# Patient Record
Sex: FEMALE | Race: WHITE | Employment: FULL TIME
[De-identification: names, ages, dates, MRNs, and addresses within clinical notes are randomized per-mention and may not be internally consistent; named-entity substitution may affect disease eponyms.]

---

## 2021-02-23 ENCOUNTER — TRANSCRIBE ORDERS (OUTPATIENT)
Dept: OTHER | Age: 31
End: 2021-02-23

## 2021-02-23 DIAGNOSIS — Z80.0 FAMILY HISTORY OF LYNCH SYNDROME: Primary | ICD-10-CM

## 2021-02-23 NOTE — TELEPHONE ENCOUNTER
Oncology/Surgical Oncology Referral Request:     Specialty Requested: Medical Oncology     Referring Provider: Dr. Ruth Rashid     Referring Clinic/Organization: [facility]    Records location: No records received     Requested Provider (if specified): Med [facility]y

## 2021-03-10 ENCOUNTER — PRE VISIT (OUTPATIENT)
Dept: ONCOLOGY | Facility: CLINIC | Age: 31
End: 2021-03-10

## 2021-03-10 ENCOUNTER — VIRTUAL VISIT (OUTPATIENT)
Dept: ONCOLOGY | Facility: CLINIC | Age: 31
End: 2021-03-10
Attending: FAMILY MEDICINE
Payer: MEDICAID

## 2021-03-10 DIAGNOSIS — Z80.49 FAMILY HISTORY OF UTERINE CANCER: ICD-10-CM

## 2021-03-10 DIAGNOSIS — Z80.0 FAMILY HISTORY OF COLON CANCER: ICD-10-CM

## 2021-03-10 DIAGNOSIS — Z84.81 FAMILY HISTORY OF GENE MUTATION: Primary | ICD-10-CM

## 2021-03-10 DIAGNOSIS — Z80.8 FAMILY HISTORY OF BRAIN CANCER: ICD-10-CM

## 2021-03-10 DIAGNOSIS — Z80.0 FAMILY HISTORY OF LYNCH SYNDROME: ICD-10-CM

## 2021-03-10 PROCEDURE — 96040 HC GENETIC COUNSELING, EACH 30 MINUTES: CPT | Mod: TEL | Performed by: GENETIC COUNSELOR, MS

## 2021-03-10 NOTE — LETTER
Cancer Risk Management  Program Locations    [facility]  [facility]  [facility]  [facility]  [facility]  Mailing Address  Cancer Risk Management Program  [facility]  [address]  [facility] [address]  [place] [address]    New patient appointments  [phone]  March 28, 2021    Emma Rosas  [address]  [facility] [address]      Dear Emma,    It was a pleasure speaking with you on the phone on 3-. Here is a copy of the progress note from our discussion. I apologize for the inadvertent delay in getting this letter to you.  If you have any additional questions, please feel free to call.    Cancer Risk Management Program Genetic Counseling Note    3/10/2021    Referring Provider: Dr. Ruth Rashid    Presenting Information:   I had a telephone visit with Emma Alison today for genetic counseling through the Cancer Risk Management Program, in order to discuss her family history of cancer, in the context of a known MSH6 gene mutation in her mother.  She presents today to review this history, cancer screening recommendations, and available genetic testing options.  This consultation was conducted via a telephone visit, because of current COVID-RIVS restrictions.    Personal History:  Emma is a 30 year old female. She does not have any personal history of cancer. In other medical history, it is reported that Emma has a history of asthma. Emma's mother has previously reported that Emma has a history of some learning challenges.    Emma reports that she had her first menstrual period at age 15 and her first child at age 21.  Emma reports that she did use oral contraceptives for around 5 years or so, but she states that she had a hysterectomy about 1 year ago because of issues with excessive menstrual bleeding and cramping.  Emma  reports that her ovaries are still intact.  Other than her Pap smears, Emma reports that she has not yet started any other routine cancer screening.  She does report that she has had one mole removed but this was not found to be cancerous.    Emma reports a history of smoking from around age 17 until around age 27, but she reports that she quit smoking over 2 years ago.  She reports no personal history of chronic, excessive alcohol use.  Emma states that she is not aware of any other personal history of any specific, potentially concerning environmental exposures with respect to cancer risk.    Family History: (Please see scanned pedigree for detailed family history information); I had previously met with Emma's mother for genetic counseling, and she had signed a release of information form to allow me to discuss the information provided/obtained during her previous genetic counseling/testing with Emma:    Emma's mother has tested positive for a mutation in the MSH6 gene called [id number] (also known as [id number]*). Emma's mother does not have a personal history of cancer, but she does have a history of one colon polyp.  This testing was prompted by the identification of an MSH6 gene mutation in Emma's mother's paternal side of the family.  Emma's mother's father  of complications of a brain tumor (GBM) in his 60s; he had a sister diagnosed with colon cancer in her 50s (who has tested positive for the MSH6 gene mutation) and a sister diagnosed with uterine cancer in her 60s (who has not had genetic testing).  Emma's mother's paternal grandmother had a history of breast cancer at age 50 and liver cancer at age 50, and Emma's mother's paternal grandfather was diagnosed with pancreatic cancer in his 60s.  Some of Emma's mother's paternal cousins have also tested positive for the familial MSH6 gene mutation.  Emma's mother also reports some other extended  paternal history of cancer, along with a family history of breast cancer in her maternal aunt (in her 50s) and a uterine cancer and a breast cancer in her maternal grandmother (in her 70s and 80s, respectively.)  Given her extended family history of cancer, Emma's mother did have a broader hereditary cancer genetic testing panel ([facility] through [employer]: APC, ABDULAZIZ, AXIN2, BARD1, BMPR1A, BRCA1, BRCA2, BRIP1, CDH1, CDK4, CDKN2A, CHEK2, DICER1, EPCAM, GREM1, HOXB13, MLH1, MSH2, MSH3, MSH6, MUTYH, NBN, NF1, NTHL1, PALB2, PMS2, POLD1, POLE, PTEN, RAD51C, RAD51D, RECQL, SMAD4, SMARCA4, STK11, and TP53) and the only gene mutation noted was the familial MSH6 gene mutation.      Emma reports that she is not aware of any family history of cancer on her father's side of the family.        Emma reports no other genetic concerns about her family medical history, other the MSH6 gene mutation in her family.      Emma reports that her family is primarily of [place] ancestry.    Discussion:    We reviewed the features of sporadic, familial, and hereditary cancers. In particular, we reviewed the hereditary cancer syndrome seen in her family: Pino syndrome.  Pino syndrome can be caused by a mutation in one of five genes:  MLH1, MSH2, MSH6, PMS2, and EPCAM.  The highest cancer risks associated with Pino syndrome include colon cancer, endometrial/uterine cancer, gastric cancer, and ovarian cancer.  Other cancers have also been reported with Pino syndrome, such as urinary tract, pancreas, bile duct, and other cancers.       We reviewed Emma's mother's previous genetic testing results that identified a mutation in the MSH6 gene.  We discussed the autosomal dominant pattern of inheritance associated with this mutation and that we would expect that Emma had a 50% risk of inheriting a copy of the MSH6 gene mutation seen in her mother.      Based on her family history, Emma meets current National  Comprehensive Cancer Network (NCCN) criteria for genetic testing of MSH6, because of her mother being identified to have a mutation in that gene.  We discussed that there are additional genes that could cause increased risk for cancer, but Emma's mother's genetic testing did not identify any other gene mutations associated with hereditary cancer syndromes.  In addition, it is reported that Emma's father has no family history of cancer.  Because of this, it seems reasonable to just test Emma for the MSH6 gene mutation seen in her mother, and Emma was in agreement with this plan.       We did talk about that since I met with Emma's mother a year ago, there have been some updates to the estimated cancer risks and cancer screening recommendations for individuals with a mutation in MSH6:    Per the National Comprehensive Cancer Network (NCCN), individuals with mutations in the MSH6 gene have an estimated:       10-44% lifetime risk of developing colon cancer (as compared to 4.2% lifetime risk in the general population)    16-49% lifetime risk of endometrial (uterine) cancer (as compared to 3.1% in the general population)     1-13% lifetime ovarian cancer risk (as compared to 1.3% in the general population).     Additional risks are seen for stomach (1-7.9%%), small bowel (1-4%), renal/ureter (0.7-5.5%), bladder (1-8.2%), pancreatic (1.4-1.6%), prostate (2.5-11.6%), brain (0.8-1.8%), breast (11.1-12.8%), and hepatobiliary tract cancers (0.2-1%). Some families with Pino syndrome also have an increased risk for sebaceous neoplasms.    Several studies have also suggested that individuals with Pino syndrome may be at increased risk for other cancers, but additional research is needed to clarify these potential risks.      The following screening, per current National Comprehensive Cancer Network (NCCN) guidelines is recommended for individuals who have Pino syndrome due to a mutation in the  MSH6 gene:       Colonoscopies starting at age 30-35 (or earlier based on family history), repeated every 1-2 years.    Annual physical/neurological exams starting at 25-30.     Possible consideration of upper endoscopy (EGD) with extended duodenoscopy starting at age 40, repeated every 3-5 years.  As part of this, H. pylori testing (and subsequent treating of this, if positive) could be considered. These recommendations are largely dependent on family history and other factors.    There is limited evidence to suggest a screening strategy for urothelial/urinary tract cancers. Annual urinalysis starting at age 30-35 can be considered. Consideration for screening is largely dependent upon family history, gender, and specific Pino syndrome mutation.    Women can consider hysterectomy due to the limitations in screening for endometrial cancer. Screening for uterine cancer has not been shown to be beneficial in women with Pino syndrome; however screening via endometrial biopsy every 1-2 years can be considered. In post-menopausal women, transvaginal ultrasound may also be considered.     A bilateral salpingo-oophorectomy (removal of both ovaries and fallopian tubes) may reduce the chance to develop ovarian cancer. Timing of this surgery may be individualized as with hysterectomy, noted above. At this time, NCCN states that there is insufficient evidence to make a specific recommendations regarding removal of the ovaries and fallopian tubes in women with MSH6 mutations.  However, some women do still consider oophorectomy, since there is somewhat limited and conflicting data about ovarian cancer risks for this gene.  Of note, there is not an effective screening method for ovarian cancer, but transvaginal ultrasound and a CA-125 blood test may be considered at the discretion of each individual's clinician.     There is limited evidence to recommend early or more frequent screening for prostate cancer in men who have Pino  syndrome. However, men with Pino syndrome are encouraged to follow prostate cancer screening as recommended in the NCCN Guidelines for Prostate Early Detection. This screening should be discussed with each individual's medical provider.    There are currently no specific screening recommendations for other potential Pino syndrome-related cancers such as pancreatic cancer, hepatobiliary tract cancer, etc. However, screening for these and other cancers may be recommended based on family history.      Medical Management: For Emma, we reviewed that the information from genetic testing may determine:    additional cancer screening for which Emma may qualify (eg. earlier and more frequent colonoscopies, more frequent dermatologic exams, etc.),    options for risk-reducing surgeries Emma could consider (e.g surgery to remove her ovaries),      and targeted therapies for Emma, if she were to develop certain cancers in the future (e.g.  immunotherapy for individuals with Pino syndrome.)       These recommendations and possible targeted therapies will be discussed in detail once genetic testing is completed.     Plan:  1) Today, Emma elected to proceed with genetic testing for the MSH6 gene mutation noted in her mother.  Emma understands that if she inherited a copy of this gene mutation from her mother, then the above-mentioned increased screening and medical management would be recommended for her.  On the other hand, if Emma did not inherit a copy of her mother's MSH6 gene mutation, then she would not need increased screening/medical management based on her mother's family history of cancer.    2) We talked about the options for sample collection: a blood sample or a saliva kit.  Emma reports she prefers to have her blood drawn, and so orders were placed for this.  Genetic test results should become available within a month after having her blood drawn.    3) Emma will have a  telephone visit or video visit in order to discuss the results.    Demetrice Baer MS, [facility]  Genetic Counselor  Ph: [phone]    Time spent on the phone: 40 minutes

## 2021-03-11 DIAGNOSIS — Z84.81 FAMILY HISTORY OF GENE MUTATION: ICD-10-CM

## 2021-03-11 DIAGNOSIS — Z80.0 FAMILY HISTORY OF LYNCH SYNDROME: ICD-10-CM

## 2021-03-15 LAB
MISCELLANEOUS TEST: NORMAL
RESULT: NORMAL
SEND OUTS MISC TEST CODE: 5555
SEND OUTS MISC TEST SPECIMEN: NORMAL
TEST NAME: NORMAL

## 2021-03-21 ENCOUNTER — HEALTH MAINTENANCE LETTER (OUTPATIENT)
Age: 31
End: 2021-03-21

## 2021-03-26 LAB — LAB SCANNED RESULT: ABNORMAL

## 2021-03-28 NOTE — PROGRESS NOTES
Cancer Risk Management Program Genetic Counseling Note    3/10/2021    Referring Provider: Dr. Ruth Rashid    Presenting Information:   I had a telephone visit with Emma Rosas today for genetic counseling through the Cancer Risk Management Program, in order to discuss her family history of cancer, in the context of a known MSH6 gene mutation in her mother.  She presents today to review this history, cancer screening recommendations, and available genetic testing options.  This consultation was conducted via a telephone visit, because of current COVID-19 restrictions (see attestation below).    Personal History:  Emma is a 30 year old female. She does not have any personal history of cancer. In other medical history, it is reported that Emma has a history of asthma. Emma's mother has previously reported that Emma has a history of some learning challenges.    Emma reports that she had her first menstrual period at age 15 and her first child at age 21.  Emma reports that she did use oral contraceptives for around 5 years or so, but she states that she had a hysterectomy about 1 year ago because of issues with excessive menstrual bleeding and cramping.  Emma reports that her ovaries are still intact.  Other than her Pap smears, Emma reports that she has not yet started any other routine cancer screening.  She does report that she has had one mole removed but this was not found to be cancerous.    Emma reports a history of smoking from around age 17 until around age 27, but she reports that she quit smoking over 2 years ago.  She reports no personal history of chronic, excessive alcohol use.  Emma states that she is not aware of any other personal history of any specific, potentially concerning environmental exposures with respect to cancer risk.    Family History: (Please see scanned pedigree for detailed family history information); I had previously met with Emma's  mother for genetic counseling, and she had signed a release of information form to allow me to discuss the information provided/obtained during her previous genetic counseling/testing with Emma:    Emma's mother has tested positive for a mutation in the MSH6 gene called [id number] (also known as [id number]*). Emma's mother does not have a personal history of cancer, but she does have a history of one colon polyp.  This testing was prompted by the identification of an MSH6 gene mutation in Emma's mother's paternal side of the family.  Emma's mother's father  of complications of a brain tumor (GBM) in his 60s; he had a sister diagnosed with colon cancer in her 50s (who has tested positive for the MSH6 gene mutation) and a sister diagnosed with uterine cancer in her 60s (who has not had genetic testing).  Emma's mother's paternal grandmother had a history of breast cancer at age 50 and liver cancer at age 50, and Emma's mother's paternal grandfather was diagnosed with pancreatic cancer in his 60s.  Some of Emma's mother's paternal cousins have also tested positive for the familial MSH6 gene mutation.  Emma's mother also reports some other extended paternal history of cancer, along with a family history of breast cancer in her maternal aunt (in her 50s) and a uterine cancer and a breast cancer in her maternal grandmother (in her 70s and 80s, respectively.)  Given her extended family history of cancer, Emma's mother did have a broader hereditary cancer genetic testing panel ([facility] through [employer]: APC, ABDULAZIZ, AXIN2, BARD1, BMPR1A, BRCA1, BRCA2, BRIP1, CDH1, CDK4, CDKN2A, CHEK2, DICER1, EPCAM, GREM1, HOXB13, MLH1, MSH2, MSH3, MSH6, MUTYH, NBN, NF1, NTHL1, PALB2, PMS2, POLD1, POLE, PTEN, RAD51C, RAD51D, RECQL, SMAD4, SMARCA4, STK11, and TP53) and the only gene mutation noted was the familial MSH6 gene mutation.      Emma reports that she is not aware of any  family history of cancer on her father's side of the family.        Emma reports no other genetic concerns about her family medical history, other the MSH6 gene mutation in her family.      Emma reports that her family is primarily of [place] ancestry.    Discussion:    We reviewed the features of sporadic, familial, and hereditary cancers. In particular, we reviewed the hereditary cancer syndrome seen in her family: Pino syndrome.  Pino syndrome can be caused by a mutation in one of five genes:  MLH1, MSH2, MSH6, PMS2, and EPCAM.  The highest cancer risks associated with Pino syndrome include colon cancer, endometrial/uterine cancer, gastric cancer, and ovarian cancer.  Other cancers have also been reported with Pino syndrome, such as urinary tract, pancreas, bile duct, and other cancers.       We reviewed Emma's mother's previous genetic testing results that identified a mutation in the MSH6 gene.  We discussed the autosomal dominant pattern of inheritance associated with this mutation and that we would expect that Emma had a 50% risk of inheriting a copy of the MSH6 gene mutation seen in her mother.      Based on her family history, Emma meets current National Comprehensive Cancer Network (NCCN) criteria for genetic testing of MSH6, because of her mother being identified to have a mutation in that gene.  We discussed that there are additional genes that could cause increased risk for cancer, but Emma's mother's genetic testing did not identify any other gene mutations associated with hereditary cancer syndromes.  In addition, it is reported that Emma's father has no family history of cancer.  Because of this, it seems reasonable to just test Emma for the MSH6 gene mutation seen in her mother, and Emma was in agreement with this plan.       We did talk about that since I met with Emma's mother a year ago, there have been some updates to the estimated cancer risks and  cancer screening recommendations for individuals with a mutation in MSH6:    Per the National Comprehensive Cancer Network (NCCN), individuals with mutations in the MSH6 gene have an estimated:       10-44% lifetime risk of developing colon cancer (as compared to 4.2% lifetime risk in the general population)    16-49% lifetime risk of endometrial (uterine) cancer (as compared to 3.1% in the general population)     1-13% lifetime ovarian cancer risk (as compared to 1.3% in the general population).     Additional risks are seen for stomach (1-7.9%%), small bowel (1-4%), renal/ureter (0.7-5.5%), bladder (1-8.2%), pancreatic (1.4-1.6%), prostate (2.5-11.6%), brain (0.8-1.8%), breast (11.1-12.8%), and hepatobiliary tract cancers (0.2-1%). Some families with Pino syndrome also have an increased risk for sebaceous neoplasms.    Several studies have also suggested that individuals with Pino syndrome may be at increased risk for other cancers, but additional research is needed to clarify these potential risks.      The following screening, per current National Comprehensive Cancer Network (NCCN) guidelines is recommended for individuals who have Pino syndrome due to a mutation in the MSH6 gene:       Colonoscopies starting at age 30-35 (or earlier based on family history), repeated every 1-2 years.    Annual physical/neurological exams starting at 25-30.     Possible consideration of upper endoscopy (EGD) with extended duodenoscopy starting at age 40, repeated every 3-5 years.  As part of this, H. pylori testing (and subsequent treating of this, if positive) could be considered. These recommendations are largely dependent on family history and other factors.    There is limited evidence to suggest a screening strategy for urothelial/urinary tract cancers. Annual urinalysis starting at age 30-35 can be considered. Consideration for screening is largely dependent upon family history, gender, and specific Pino syndrome  mutation.    Women can consider hysterectomy due to the limitations in screening for endometrial cancer. Screening for uterine cancer has not been shown to be beneficial in women with Pino syndrome; however screening via endometrial biopsy every 1-2 years can be considered. In post-menopausal women, transvaginal ultrasound may also be considered.     A bilateral salpingo-oophorectomy (removal of both ovaries and fallopian tubes) may reduce the chance to develop ovarian cancer. Timing of this surgery may be individualized as with hysterectomy, noted above. At this time, NCCN states that there is insufficient evidence to make a specific recommendations regarding removal of the ovaries and fallopian tubes in women with MSH6 mutations.  However, some women do still consider oophorectomy, since there is somewhat limited and conflicting data about ovarian cancer risks for this gene.  Of note, there is not an effective screening method for ovarian cancer, but transvaginal ultrasound and a CA-125 blood test may be considered at the discretion of each individual's clinician.     There is limited evidence to recommend early or more frequent screening for prostate cancer in men who have Pino syndrome. However, men with Pino syndrome are encouraged to follow prostate cancer screening as recommended in the NCCN Guidelines for Prostate Early Detection. This screening should be discussed with each individual's medical provider.    There are currently no specific screening recommendations for other potential Pino syndrome-related cancers such as pancreatic cancer, hepatobiliary tract cancer, etc. However, screening for these and other cancers may be recommended based on family history.      Medical Management: For Emma, we reviewed that the information from genetic testing may determine:    additional cancer screening for which Emma may qualify (eg. earlier and more frequent colonoscopies, more frequent dermatologic  exams, etc.),    options for risk-reducing surgeries Emma could consider (e.g surgery to remove her ovaries),      and targeted therapies for Emma, if she were to develop certain cancers in the future (e.g.  immunotherapy for individuals with Pino syndrome.)       These recommendations and possible targeted therapies will be discussed in detail once genetic testing is completed.     Plan:  1) Today, Emma elected to proceed with genetic testing for the MSH6 gene mutation noted in her mother.  Emma understands that if she inherited a copy of this gene mutation from her mother, then the above-mentioned increased screening and medical management would be recommended for her.  On the other hand, if Emma did not inherit a copy of her mother's MSH6 gene mutation, then she would not need increased screening/medical management based on her mother's family history of cancer.    2) We talked about the options for sample collection: a blood sample or a saliva kit.  Emma reports she prefers to have her blood drawn, and so orders were placed for this.  Genetic test results should become available within a month after having her blood drawn.    3) Emma will have a telephone visit or video visit in order to discuss the results.    Demetrice Baer MS, [facility]  Genetic Counselor  Ph: [phone]    Time spent on the phone: 40 minutes    Emma is a 30 year old who is being evaluated via a billable telephone visit.      What phone number would you like to be contacted at? [phone]  How would you like to obtain your AVS? mail     Phone call duration: 40 minutes

## 2021-04-06 ENCOUNTER — VIRTUAL VISIT (OUTPATIENT)
Dept: ONCOLOGY | Facility: CLINIC | Age: 31
End: 2021-04-06
Attending: GENETIC COUNSELOR, MS
Payer: MEDICAID

## 2021-04-06 DIAGNOSIS — Z15.09 MSH6-RELATED LYNCH SYNDROME (HNPCC5): Primary | ICD-10-CM

## 2021-04-06 DIAGNOSIS — Z80.0 FAMILY HISTORY OF COLON CANCER: ICD-10-CM

## 2021-04-06 DIAGNOSIS — Z80.49 FAMILY HISTORY OF UTERINE CANCER: ICD-10-CM

## 2021-04-06 DIAGNOSIS — Z80.8 FAMILY HISTORY OF BRAIN CANCER: ICD-10-CM

## 2021-04-06 PROCEDURE — 999N000069 HC STATISTIC GENETIC COUNSELING, < 16 MIN: Mod: TEL | Performed by: GENETIC COUNSELOR, MS

## 2021-04-06 NOTE — LETTER
"    Cancer Risk Management  Program Locations    [facility]  [facility]  Mailing Address  Cancer Risk Management Program  [facility]  [address]  [place] [address]    New patient appointments  [phone]  April 6, 2021    Emma Rosas  [address] [facility] [address]        Dear Emma,    It was a pleasure speaking with you and your mother again on the phone on April 6, 2021. Here is a copy of the general \"after visit summary\" from our conversation.  If you have any additional questions, please feel free to call.  You will also later receive in the mail a more detailed clinic note of our conversation as well.    Resources for Pino Syndrome     Pino Syndrome International -  http://[employer].com/   Pino [employer] ([employer]) provides support for individuals with Pino syndrome. This organization was founded by patients and health care providers who specialize in Pino syndrome. [employer] strives to raise awareness, as well as educate others about Pino syndrome.     Hereditary Colon Cancer Takes Guts - http://www.hcctakesguts.org/peer-support  This is a non-profit organization that supports and connects individuals with hereditary colon cancer syndromes.  They also promote research and health care initiatives.     I Have Pino Syndrome - http://www.ihavelynchsyndrome.org/   The goal of this non-profit organization is to educate others and raise awareness about Pino syndrome in the medical community, as well as the public.      Pino Syndrome Screening Network - http://www.lynchscreening.net/   This organization was founded in 2011, with the goal to promote universal tumor screening for Pino syndrome for those with a diagnosis of colorectal and endometrial cancers.       Other References:    Genetics " Home Reference - http://ghr.nlm.nih.gov/condition/carlson-syndrome    American Cancer Society - www.cancer.org    How Do I Tell My Children? This article is about the BRCA gene for hereditary breast cancer (a different hereditary cancer syndrome), but the themes of the article about how to talk to kids apply to Carlson syndrome, too.  http://www.facingourrisk.org/understanding-brca-and-hboc/publications/newsletter/archives/2008winter/how-tell-children.php     National Society of Genetic Counselors: http://www.nsgc.org/

## 2021-04-06 NOTE — LETTER
Cancer Risk Management  Program Locations    [facility]  [facility]  [facility]  [facility]  [facility]  Mailing Address  Cancer Risk Management Program  [facility]  [address]  [facility] [address]  [place] [address]    New patient appointments  [phone]  April 10, 2021    Emma Rosas  [address]  [facility] [address]      Dear Emma,    It was a pleasure speaking with you and your mother on the phone on 4-6-2021. Here is a copy of the progress note from our discussion. If you have any additional questions, please feel free to call.    Cancer Risk Management Program Genetic Counseling Note    4/6/2021    Referring Provider:  Dr. Ruth Rashid    Presenting Information:   Emma had a return telephone visit to the Cancer Risk Management Program, in order to to discuss her genetic testing results. Her blood was drawn on March 11, 2021, and genetic testing for the MSH6 gene was requested from [facility]. This testing was done because of her family history of of Pino syndrome associated with a known mutation in the MSH6 gene.  A previous genetic testing panel in Emma's mother had identified that Emma's mother had inherited a copy of this MS6 gene mutation.  Therefore, Emma had presented for genetic testing for the same gene mutation; no additional genetic testing was indicated for Emma, because no other mutations associated with hereditary cancer were noted in her mother, and there was no known family history of cancer on her father's side of family.  Emma's mother participated in today's telephone conversation; Emma has previously signed a release of information to allow for the sharing of medical information regarding her genetic counseling/genetic testing with her mother.    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 We discussed that this mutation is associated with a diagnosis of Pino syndrome and an increased risk for colon cancer, uterine cancer, and possibly other cancers. We discussed the impact of this testing on Emma in detail (see sections below for more information).     A copy of the test report can be found in the Laboratory tab, dated 3-, and named \"SEND OUTS [facility] TEST.\" The report is scanned in as a linked document.    Cancer Risks:   Today, we reviewed the previously discussed information about cancer risks associated with mutations in MSH6.  We did talk about that since I met with Emma's mother a year ago, there have been some updates to the estimated cancer risks and cancer screening recommendations for individuals with a mutation in MSH6:     Per the National Comprehensive Cancer Network (NCCN), individuals with mutations in the MSH6 gene have an estimated:       10-44% lifetime risk of developing colon cancer (as compared to a 4.2% risk in the general population)    16-49% lifetime risk of endometrial (uterine) cancer (as compared to 3.1% in the general population)     1-13% lifetime ovarian cancer risk (as compared to 1.3% in the general population)     Additional quotes risks are: stomach (1-7.9%), small bowel (1-4%), renal/ureter (0.7-5.5%), bladder (1-8.2%), pancreatic (1.4-1.6%), prostate (2.5-11.6%), brain (0.8-1.8%), breast (11.1-12.8%), and hepatobiliary tract cancers (0.2-1%). Some families with Pino syndrome also have an increased risk for sebaceous neoplasms.      We talked about that the current scientific literature seems to indicate that MSH6 gene mutations are primarily associated with increased risk for colorectal cancer and endometrial cancer.  We talked about that many of the other risk estimates provided above seem to overlap with the general population risks for " these particular types of cancers, and so it has not been observed at this time that MSH 6 mutations are strongly associated with those other cancers.    Several studies have also suggested that individuals with Pino syndrome may be at increased risk for other cancers, but additional research is needed to clarify these potential risks.       Cancer Screening and Prevention:  Today, we also discussed the most recent cancer screening recommendations for individuals with an MSH6 gene mutation:    The following screening, per current National Comprehensive Cancer Network (NCCN) guidelines, is recommended for individuals who have Pino syndrome due to a mutation in the MSH6 gene:       Colonoscopies starting at age 30-35 (or earlier, if indicated based on family history of earlier colon cancers), repeated every 1-2 years.    Annual physical/neurological exams starting at 25-30    Possible consideration of upper endoscopy (EGD) with extended duodenoscopy starting at age 40, repeated every 3-5 years.  As part of this, H. pylori testing (and subsequent treating of this, if positive) could be considered. These recommendations are largely dependent on family history and other factors.    There is limited evidence to suggest a screening strategy for urothelial/urinary tract cancers. Annual urinalysis starting at age 30-35 can be considered. Consideration for screening is largely dependent upon family history, gender, and specific Pino syndrome mutation.    Women can consider hysterectomy due to the limitations in screening for endometrial cancer. Screening for uterine cancer has not been shown to be beneficial in women with Pino syndrome; however, screening via endometrial biopsy every 1-2 years can be considered. In post-menopausal women, transvaginal ultrasound may also be considered. Women with Pino syndrome should be aware of the signs and symptoms of endometrial (uterine) cancer, such as abnormal uterine bleeding or  postmenopausal bleeding, and should report these findings promptly to her physician, should they occur.  (Of note, Emma has already undergone a hysterectomy previously.)    A bilateral salpingo-oophorectomy (removal of both ovaries and fallopian tubes) may reduce the chance to develop ovarian cancer. Timing of this surgery may be individualized, depending on personal factors and family history. At this time, NCCN states that there is insufficient evidence to make a specific recommendations regarding removal of the ovaries and fallopian tubes in women with a MSH6 mutation.  However, some women do still consider oophorectomy, since there is somewhat limited and conflicting data about ovarian cancer risks for this gene.  Of note, there is not an effective screening method for ovarian cancer, but transvaginal ultrasound and a CA-125 blood test may be considered at the discretion of each individual's clinician. Additionally, women should be aware of the signs and symptoms of ovarian cancer: pelvic/abdominal pain, bloating, increased abdominal girth, difficulty eating, early satiety, and urinary frequency or urgency. These symptoms, should be promptly reported to a physician.    There is limited evidence to recommend early or more frequent screening for prostate cancer in men who have Pino syndrome. However, men with Pino syndrome are encouraged to follow prostate cancer screening as recommended in the NCCN Guidelines for Prostate Early Detection. This screening should be discussed with each individual's medical provider.    There are currently no specific screening recommendations for other potential Pino syndrome-related cancers such as pancreatic cancer, hepatobiliary tract cancer, etc. However, screening for these and other cancers may be recommended based on family history.     Of note, some therapies for certain cancers may be more effective in individuals with Pino syndrome. Emma should discuss this  with her physicians, if she were to be diagnosed with cancer in the future.    We discussed that Emma could participate in our Cancer Risk Management Program in which our nursing specialist (Yvonne Chacko, APRN-CNS) provides an individual screening plan and assists with medical management.  Both Emma and her mother state that, at this time, they are comfortable with being followed by their primary provider (Dr. Ruth Rashid) for the recommended screening, and so they did not want a referral to Yvonne Chacko at this time.  If desired in the future, this can be arranged by callin1 [phone].    Of note, the above information is based on our current understanding of Emma's genetic findings. Emma is encouraged to reach out to me regularly regarding any pertinent updates to her personal and/or family history of cancer, as our understanding of the genetic findings in her family may change over time.     Implications for Family Members:  We reviewed that mutations in the MSH6 gene are inherited in an autosomal dominant pattern. This means that each of Emma's children would be expected to have had a 50% chance of inheriting a copy of the same mutation. We talked about that testing for a familial MSH6 gene mutation is generally NOT recommended for children, as this information is not likely to be pertinent to their medical care prior to age 18; because of this, it has been recommended that testing only be considered when that individual is old enough to make their own decisions about proceeding with testing and also understand the potential implications of these test results.       Likewise, each of Emma's maternal siblings has a 50% risk of having inherited a copy of the same MSH6 mutation.  I am happy to help her relatives connect with a genetic counselor in their area, if they would like to discuss testing.  Emma's mother reports that she has already had some conversations with  Emma's older brother about genetic testing.    (In rare situations in which both parents have a mutation in the MSH6 gene, their children each have a 25% risk for constitutional mismatch repair deficiency syndrome (CMMRD).  CMMRD is a disorder that causes cafe-au-lait spots and risk for childhood cancers. For individuals of childbearing age with MSH6 mutations, genetic counseling and genetic testing may be advised for their partners.  As Emma had has a hysterectomy, this is not expected to be a reproductive issue for her.)    Additional Testing Considerations:  It is possible Emma does carry a gene or combination of genes and environment that increase her  risk for cancer that was not identifiable through this particular genetic test. Emma is encouraged to contact me in the future if she wants to know if there is additional genetic testing that might be available/indicated for her then or if she wishes to readdress larger gene panel options in the future.     Support Resources:  I provided Emma with information regarding national and local support resources regarding Pino syndrome (see after visit summary).    Plan:  1.  I will mail Emma a copy of her test results and support resources.  She will also get a copy of this clinic note, as well as a copy of the current NCCN data/recommendations regarding Pino syndrome related to a mutation in MSH6.    2.  Emma plans to follow up with her primary care provider regarding the above screening recommendations.  Emma and/or her mother or primary care provider are welcome to contact me at any time if they want to see if there are any changes to the recommendations for cancer screening/prevention for individuals with a MSH6 gene mutation.    If Emma has additional questions, I encouraged her to contact me directly at [phone] or at [email or web address] or through [email or web address].     Demetrice Baer MS, [facility]  Genetic Counselor  Ph:  [phone]    Time spent face to face over the phone: 15 minutes

## 2021-04-06 NOTE — PATIENT INSTRUCTIONS
Patient instructions sent through mail, since this was a virtual visit.  See letters tab.    Demetrice Baer MS, [facility]  Genetic Counselor  Ph: [phone]  Pager: [phone]

## 2021-04-06 NOTE — PROGRESS NOTES
"Cancer Risk Management Program Genetic Counseling Note    4/6/2021    Referring Provider:  Dr. Ruth Rashid    Presenting Information:   Emma had a return telephone visit to the Cancer Risk Management Program, in order to to discuss her genetic testing results. Her blood was drawn on March 11, 2021, and genetic testing for the MSH6 gene was requested from [facility]. This testing was done because of her family history of of Pino syndrome associated with a known mutation in the MSH6 gene.  A previous genetic testing panel in Emma's mother had identified that Emma's mother had inherited a copy of this MS6 gene mutation.  Therefore, Emma had presented for genetic testing for the same gene mutation; no additional genetic testing was indicated for Emma, because no other mutations associated with hereditary cancer were noted in her mother, and there was no known family history of cancer on her father's side of family.  Emma's mother participated in today's telephone conversation; Emma has previously signed a release of information to allow for the sharing of medical information regarding her genetic counseling/genetic testing with her mother.    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 We discussed that this mutation is associated with a diagnosis of Pino syndrome and an increased risk for colon cancer, uterine cancer, and possibly other cancers. We discussed the impact of this testing on Emma in detail (see sections below for more information).     A copy of the test report can be found in the Laboratory tab, dated 3-, and named \"[facility].\" The report is scanned in as a linked document.    Cancer Risks:   Today, we reviewed the previously discussed information about cancer risks associated with mutations in MSH6.  We did talk about that since I " met with Emma's mother a year ago, there have been some updates to the estimated cancer risks and cancer screening recommendations for individuals with a mutation in MSH6:     Per the National Comprehensive Cancer Network (NCCN), individuals with mutations in the MSH6 gene have an estimated:       10-44% lifetime risk of developing colon cancer (as compared to a 4.2% risk in the general population)    16-49% lifetime risk of endometrial (uterine) cancer (as compared to 3.1% in the general population)     1-13% lifetime ovarian cancer risk (as compared to 1.3% in the general population)     Additional quotes risks are: stomach (1-7.9%), small bowel (1-4%), renal/ureter (0.7-5.5%), bladder (1-8.2%), pancreatic (1.4-1.6%), prostate (2.5-11.6%), brain (0.8-1.8%), breast (11.1-12.8%), and hepatobiliary tract cancers (0.2-1%). Some families with Pino syndrome also have an increased risk for sebaceous neoplasms.      We talked about that the current scientific literature seems to indicate that MSH6 gene mutations are primarily associated with increased risk for colorectal cancer and endometrial cancer.  We talked about that many of the other risk estimates provided above seem to overlap with the general population risks for these particular types of cancers, and so it has not been observed at this time that MSH 6 mutations are strongly associated with those other cancers.    Several studies have also suggested that individuals with Pino syndrome may be at increased risk for other cancers, but additional research is needed to clarify these potential risks.       Cancer Screening and Prevention:  Today, we also discussed the most recent cancer screening recommendations for individuals with an MSH6 gene mutation:    The following screening, per current National Comprehensive Cancer Network (NCCN) guidelines, is recommended for individuals who have Pino syndrome due to a mutation in the MSH6 gene:       Colonoscopies  starting at age 30-35 (or earlier, if indicated based on family history of earlier colon cancers), repeated every 1-2 years.    Annual physical/neurological exams starting at 25-30    Possible consideration of upper endoscopy (EGD) with extended duodenoscopy starting at age 40, repeated every 3-5 years.  As part of this, H. pylori testing (and subsequent treating of this, if positive) could be considered. These recommendations are largely dependent on family history and other factors.    There is limited evidence to suggest a screening strategy for urothelial/urinary tract cancers. Annual urinalysis starting at age 30-35 can be considered. Consideration for screening is largely dependent upon family history, gender, and specific Pino syndrome mutation.    Women can consider hysterectomy due to the limitations in screening for endometrial cancer. Screening for uterine cancer has not been shown to be beneficial in women with Pino syndrome; however, screening via endometrial biopsy every 1-2 years can be considered. In post-menopausal women, transvaginal ultrasound may also be considered. Women with Pino syndrome should be aware of the signs and symptoms of endometrial (uterine) cancer, such as abnormal uterine bleeding or postmenopausal bleeding, and should report these findings promptly to her physician, should they occur.  (Of note, Emma has already undergone a hysterectomy previously.)    A bilateral salpingo-oophorectomy (removal of both ovaries and fallopian tubes) may reduce the chance to develop ovarian cancer. Timing of this surgery may be individualized, depending on personal factors and family history. At this time, NCCN states that there is insufficient evidence to make a specific recommendations regarding removal of the ovaries and fallopian tubes in women with a MSH6 mutation.  However, some women do still consider oophorectomy, since there is somewhat limited and conflicting data about ovarian  cancer risks for this gene.  Of note, there is not an effective screening method for ovarian cancer, but transvaginal ultrasound and a CA-125 blood test may be considered at the discretion of each individual's clinician. Additionally, women should be aware of the signs and symptoms of ovarian cancer: pelvic/abdominal pain, bloating, increased abdominal girth, difficulty eating, early satiety, and urinary frequency or urgency. These symptoms, should be promptly reported to a physician.    There is limited evidence to recommend early or more frequent screening for prostate cancer in men who have Pino syndrome. However, men with Pino syndrome are encouraged to follow prostate cancer screening as recommended in the NCCN Guidelines for Prostate Early Detection. This screening should be discussed with each individual's medical provider.    There are currently no specific screening recommendations for other potential Pino syndrome-related cancers such as pancreatic cancer, hepatobiliary tract cancer, etc. However, screening for these and other cancers may be recommended based on family history.     Of note, some therapies for certain cancers may be more effective in individuals with Pino syndrome. Emma should discuss this with her physicians, if she were to be diagnosed with cancer in the future.    We discussed that Emma could participate in our Cancer Risk Management Program in which our nursing specialist (Yvonne Chacko, [facility]) provides an individual screening plan and assists with medical management.  Both Emma and her mother state that, at this time, they are comfortable with being followed by their primary provider (Dr. Ruth Rashid) for the recommended screening, and so they did not want a referral to Yvonne Chacko at this time.  If desired in the future, this can be arranged by callin1 [phone].    Of note, the above information is based on our current understanding of Emma's  genetic findings. Emma is encouraged to reach out to me regularly regarding any pertinent updates to her personal and/or family history of cancer, as our understanding of the genetic findings in her family may change over time.     Implications for Family Members:  We reviewed that mutations in the MSH6 gene are inherited in an autosomal dominant pattern. This means that each of Emma's children would be expected to have had a 50% chance of inheriting a copy of the same mutation. We talked about that testing for a familial MSH6 gene mutation is generally NOT recommended for children, as this information is not likely to be pertinent to their medical care prior to age 18; because of this, it has been recommended that testing only be considered when that individual is old enough to make their own decisions about proceeding with testing and also understand the potential implications of these test results.       Likewise, each of Emma's maternal siblings has a 50% risk of having inherited a copy of the same MSH6 mutation.  I am happy to help her relatives connect with a genetic counselor in their area, if they would like to discuss testing.  Emma's mother reports that she has already had some conversations with mEma's older brother about genetic testing.    (In rare situations in which both parents have a mutation in the MSH6 gene, their children each have a 25% risk for constitutional mismatch repair deficiency syndrome (CMMRD).  CMMRD is a disorder that causes cafe-au-lait spots and risk for childhood cancers. For individuals of childbearing age with MSH6 mutations, genetic counseling and genetic testing may be advised for their partners.  As Emma had has a hysterectomy, this is not expected to be a reproductive issue for her.)    Additional Testing Considerations:  It is possible Emma does carry a gene or combination of genes and environment that increase her  risk for cancer that was not  identifiable through this particular genetic test. Emma is encouraged to contact me in the future if she wants to know if there is additional genetic testing that might be available/indicated for her then or if she wishes to readdress larger gene panel options in the future.     Support Resources:  I provided Emma with information regarding national and local support resources regarding Pino syndrome (see after visit summary).    Plan:  1.  I will mail Emma a copy of her test results and support resources.  She will also get a copy of this clinic note, as well as a copy of the current NCCN data/recommendations regarding Pino syndrome related to a mutation in MSH6.    2.  Emma plans to follow up with her primary care provider regarding the above screening recommendations.  Emma and/or her mother or primary care provider are welcome to contact me at any time if they want to see if there are any changes to the recommendations for cancer screening/prevention for individuals with a MSH6 gene mutation.    If Emma has additional questions, I encouraged her to contact me directly at [phone] or at [email or web address] or through [email or web address].     Demetrice Baer MS, [facility]  Genetic Counselor  Ph: [phone]    Time spent face to face over the phone: 15 minutes

## 2021-09-04 ENCOUNTER — HEALTH MAINTENANCE LETTER (OUTPATIENT)
Age: 31
End: 2021-09-04

## 2022-04-16 ENCOUNTER — HEALTH MAINTENANCE LETTER (OUTPATIENT)
Age: 32
End: 2022-04-16

## 2022-10-22 ENCOUNTER — HEALTH MAINTENANCE LETTER (OUTPATIENT)
Age: 32
End: 2022-10-22

## 2023-06-01 ENCOUNTER — HEALTH MAINTENANCE LETTER (OUTPATIENT)
Age: 33
End: 2023-06-01